# Patient Record
Sex: FEMALE | Race: WHITE | Employment: FULL TIME | URBAN - METROPOLITAN AREA
[De-identification: names, ages, dates, MRNs, and addresses within clinical notes are randomized per-mention and may not be internally consistent; named-entity substitution may affect disease eponyms.]

---

## 2017-07-20 ENCOUNTER — HOSPITAL ENCOUNTER (OUTPATIENT)
Dept: PREADMISSION TESTING | Age: 57
Discharge: HOME | End: 2017-07-20
Payer: COMMERCIAL

## 2017-07-20 LAB
ALBUMIN SERPL BCP-MCNC: 3.9 G/DL (ref 3.4–5)
ALBUMIN/GLOB SERPL: 1.3 {RATIO} (ref 0.8–1.7)
ALP SERPL-CCNC: 57 U/L (ref 45–117)
ALT SERPL-CCNC: 28 U/L (ref 13–56)
ANION GAP BLD CALC-SCNC: 5 MMOL/L (ref 3–18)
AST SERPL W P-5'-P-CCNC: 24 U/L (ref 15–37)
ATRIAL RATE: 51 BPM
BASOPHILS # BLD AUTO: 0.1 K/UL (ref 0–0.06)
BASOPHILS # BLD: 1 % (ref 0–2)
BILIRUB SERPL-MCNC: 0.6 MG/DL (ref 0.2–1)
BUN SERPL-MCNC: 7 MG/DL (ref 7–18)
BUN/CREAT SERPL: 9 (ref 12–20)
CALCIUM SERPL-MCNC: 9 MG/DL (ref 8.5–10.1)
CALCULATED P AXIS, ECG09: 31 DEGREES
CALCULATED R AXIS, ECG10: 11 DEGREES
CALCULATED T AXIS, ECG11: 49 DEGREES
CHLORIDE SERPL-SCNC: 104 MMOL/L (ref 100–108)
CO2 SERPL-SCNC: 32 MMOL/L (ref 21–32)
CREAT SERPL-MCNC: 0.8 MG/DL (ref 0.6–1.3)
DIAGNOSIS, 93000: NORMAL
DIFFERENTIAL METHOD BLD: ABNORMAL
EOSINOPHIL # BLD: 0.1 K/UL (ref 0–0.4)
EOSINOPHIL NFR BLD: 2 % (ref 0–5)
ERYTHROCYTE [DISTWIDTH] IN BLOOD BY AUTOMATED COUNT: 13.5 % (ref 11.6–14.5)
GLOBULIN SER CALC-MCNC: 3.1 G/DL (ref 2–4)
GLUCOSE SERPL-MCNC: 99 MG/DL (ref 74–99)
HCT VFR BLD AUTO: 39.3 % (ref 35–45)
HGB BLD-MCNC: 13.5 G/DL (ref 12–16)
LYMPHOCYTES # BLD AUTO: 23 % (ref 21–52)
LYMPHOCYTES # BLD: 1.6 K/UL (ref 0.9–3.6)
MCH RBC QN AUTO: 29.9 PG (ref 24–34)
MCHC RBC AUTO-ENTMCNC: 34.4 G/DL (ref 31–37)
MCV RBC AUTO: 87.1 FL (ref 74–97)
MONOCYTES # BLD: 0.5 K/UL (ref 0.05–1.2)
MONOCYTES NFR BLD AUTO: 7 % (ref 3–10)
NEUTS SEG # BLD: 4.7 K/UL (ref 1.8–8)
NEUTS SEG NFR BLD AUTO: 67 % (ref 40–73)
P-R INTERVAL, ECG05: 144 MS
PLATELET # BLD AUTO: 314 K/UL (ref 135–420)
PMV BLD AUTO: 10.3 FL (ref 9.2–11.8)
POTASSIUM SERPL-SCNC: 3.8 MMOL/L (ref 3.5–5.5)
PROT SERPL-MCNC: 7 G/DL (ref 6.4–8.2)
Q-T INTERVAL, ECG07: 456 MS
QRS DURATION, ECG06: 88 MS
QTC CALCULATION (BEZET), ECG08: 420 MS
RBC # BLD AUTO: 4.51 M/UL (ref 4.2–5.3)
SODIUM SERPL-SCNC: 141 MMOL/L (ref 136–145)
VENTRICULAR RATE, ECG03: 51 BPM
WBC # BLD AUTO: 7 K/UL (ref 4.6–13.2)

## 2017-07-20 PROCEDURE — 85025 COMPLETE CBC W/AUTO DIFF WBC: CPT | Performed by: OBSTETRICS & GYNECOLOGY

## 2017-07-20 PROCEDURE — 80053 COMPREHEN METABOLIC PANEL: CPT | Performed by: OBSTETRICS & GYNECOLOGY

## 2017-07-20 PROCEDURE — 36415 COLL VENOUS BLD VENIPUNCTURE: CPT | Performed by: OBSTETRICS & GYNECOLOGY

## 2017-07-20 PROCEDURE — 93005 ELECTROCARDIOGRAM TRACING: CPT

## 2017-07-25 ENCOUNTER — ANESTHESIA EVENT (OUTPATIENT)
Dept: SURGERY | Age: 57
End: 2017-07-25
Payer: COMMERCIAL

## 2017-07-26 ENCOUNTER — ANESTHESIA (OUTPATIENT)
Dept: SURGERY | Age: 57
End: 2017-07-26
Payer: COMMERCIAL

## 2017-07-26 ENCOUNTER — HOSPITAL ENCOUNTER (OUTPATIENT)
Age: 57
Setting detail: OUTPATIENT SURGERY
Discharge: HOME | End: 2017-07-26
Attending: OBSTETRICS & GYNECOLOGY | Admitting: OBSTETRICS & GYNECOLOGY
Payer: COMMERCIAL

## 2017-07-26 VITALS
RESPIRATION RATE: 16 BRPM | BODY MASS INDEX: 32.25 KG/M2 | OXYGEN SATURATION: 98 % | DIASTOLIC BLOOD PRESSURE: 52 MMHG | HEIGHT: 61 IN | WEIGHT: 170.8 LBS | TEMPERATURE: 97.5 F | HEART RATE: 68 BPM | SYSTOLIC BLOOD PRESSURE: 123 MMHG

## 2017-07-26 PROBLEM — R10.9 ABDOMINAL PAIN, UNSPECIFIED SITE: Status: ACTIVE | Noted: 2017-07-26

## 2017-07-26 PROBLEM — R10.9 ABDOMINAL PAIN: Chronic | Status: ACTIVE | Noted: 2017-07-26

## 2017-07-26 PROBLEM — D25.0 SUBMUCOUS LEIOMYOMA OF UTERUS: Chronic | Status: ACTIVE | Noted: 2017-07-26

## 2017-07-26 PROCEDURE — 74011000250 HC RX REV CODE- 250

## 2017-07-26 PROCEDURE — 74011250636 HC RX REV CODE- 250/636

## 2017-07-26 PROCEDURE — 77030020255 HC SOL INJ LR 1000ML BG: Performed by: OBSTETRICS & GYNECOLOGY

## 2017-07-26 PROCEDURE — 77030012508 HC MSK AIRWY LMA AMBU -A: Performed by: SPECIALIST

## 2017-07-26 PROCEDURE — 74011250636 HC RX REV CODE- 250/636: Performed by: OBSTETRICS & GYNECOLOGY

## 2017-07-26 PROCEDURE — 76210000021 HC REC RM PH II 0.5 TO 1 HR: Performed by: OBSTETRICS & GYNECOLOGY

## 2017-07-26 PROCEDURE — 74011000250 HC RX REV CODE- 250: Performed by: OBSTETRICS & GYNECOLOGY

## 2017-07-26 PROCEDURE — 76210000006 HC OR PH I REC 0.5 TO 1 HR: Performed by: OBSTETRICS & GYNECOLOGY

## 2017-07-26 PROCEDURE — 77030005537 HC CATH URETH BARD -A: Performed by: OBSTETRICS & GYNECOLOGY

## 2017-07-26 PROCEDURE — 88305 TISSUE EXAM BY PATHOLOGIST: CPT | Performed by: OBSTETRICS & GYNECOLOGY

## 2017-07-26 PROCEDURE — 76060000033 HC ANESTHESIA 1 TO 1.5 HR: Performed by: OBSTETRICS & GYNECOLOGY

## 2017-07-26 PROCEDURE — 76010000149 HC OR TIME 1 TO 1.5 HR: Performed by: OBSTETRICS & GYNECOLOGY

## 2017-07-26 PROCEDURE — 77030032151 HC HYSTSCP FLD MGMT KT DISP S&N -D: Performed by: OBSTETRICS & GYNECOLOGY

## 2017-07-26 PROCEDURE — 77030020782 HC GWN BAIR PAWS FLX 3M -B: Performed by: OBSTETRICS & GYNECOLOGY

## 2017-07-26 RX ORDER — DEXAMETHASONE SODIUM PHOSPHATE 4 MG/ML
INJECTION, SOLUTION INTRA-ARTICULAR; INTRALESIONAL; INTRAMUSCULAR; INTRAVENOUS; SOFT TISSUE AS NEEDED
Status: DISCONTINUED | OUTPATIENT
Start: 2017-07-26 | End: 2017-07-26 | Stop reason: HOSPADM

## 2017-07-26 RX ORDER — FENTANYL CITRATE 50 UG/ML
INJECTION, SOLUTION INTRAMUSCULAR; INTRAVENOUS AS NEEDED
Status: DISCONTINUED | OUTPATIENT
Start: 2017-07-26 | End: 2017-07-26 | Stop reason: HOSPADM

## 2017-07-26 RX ORDER — KETOROLAC TROMETHAMINE 30 MG/ML
30 INJECTION, SOLUTION INTRAMUSCULAR; INTRAVENOUS
Status: DISCONTINUED | OUTPATIENT
Start: 2017-07-26 | End: 2017-07-26 | Stop reason: HOSPADM

## 2017-07-26 RX ORDER — PROPOFOL 10 MG/ML
INJECTION, EMULSION INTRAVENOUS AS NEEDED
Status: DISCONTINUED | OUTPATIENT
Start: 2017-07-26 | End: 2017-07-26 | Stop reason: HOSPADM

## 2017-07-26 RX ORDER — ONDANSETRON 2 MG/ML
4 INJECTION INTRAMUSCULAR; INTRAVENOUS ONCE
Status: DISCONTINUED | OUTPATIENT
Start: 2017-07-26 | End: 2017-07-26 | Stop reason: HOSPADM

## 2017-07-26 RX ORDER — NALOXONE HYDROCHLORIDE 0.4 MG/ML
0.1 INJECTION, SOLUTION INTRAMUSCULAR; INTRAVENOUS; SUBCUTANEOUS AS NEEDED
Status: DISCONTINUED | OUTPATIENT
Start: 2017-07-26 | End: 2017-07-26 | Stop reason: HOSPADM

## 2017-07-26 RX ORDER — ACETAMINOPHEN 10 MG/ML
1000 INJECTION, SOLUTION INTRAVENOUS
Status: DISCONTINUED | OUTPATIENT
Start: 2017-07-26 | End: 2017-07-26 | Stop reason: HOSPADM

## 2017-07-26 RX ORDER — LIDOCAINE HYDROCHLORIDE 20 MG/ML
INJECTION, SOLUTION EPIDURAL; INFILTRATION; INTRACAUDAL; PERINEURAL AS NEEDED
Status: DISCONTINUED | OUTPATIENT
Start: 2017-07-26 | End: 2017-07-26 | Stop reason: HOSPADM

## 2017-07-26 RX ORDER — ONDANSETRON 2 MG/ML
INJECTION INTRAMUSCULAR; INTRAVENOUS AS NEEDED
Status: DISCONTINUED | OUTPATIENT
Start: 2017-07-26 | End: 2017-07-26 | Stop reason: HOSPADM

## 2017-07-26 RX ORDER — FENTANYL CITRATE 50 UG/ML
25 INJECTION, SOLUTION INTRAMUSCULAR; INTRAVENOUS AS NEEDED
Status: DISCONTINUED | OUTPATIENT
Start: 2017-07-26 | End: 2017-07-26 | Stop reason: HOSPADM

## 2017-07-26 RX ORDER — HYDROMORPHONE HYDROCHLORIDE 1 MG/ML
0.5 INJECTION, SOLUTION INTRAMUSCULAR; INTRAVENOUS; SUBCUTANEOUS
Status: DISCONTINUED | OUTPATIENT
Start: 2017-07-26 | End: 2017-07-26 | Stop reason: HOSPADM

## 2017-07-26 RX ORDER — SODIUM CHLORIDE, SODIUM LACTATE, POTASSIUM CHLORIDE, CALCIUM CHLORIDE 600; 310; 30; 20 MG/100ML; MG/100ML; MG/100ML; MG/100ML
125 INJECTION, SOLUTION INTRAVENOUS CONTINUOUS
Status: DISCONTINUED | OUTPATIENT
Start: 2017-07-26 | End: 2017-07-26 | Stop reason: HOSPADM

## 2017-07-26 RX ORDER — MAGNESIUM SULFATE 100 %
4 CRYSTALS MISCELLANEOUS AS NEEDED
Status: DISCONTINUED | OUTPATIENT
Start: 2017-07-26 | End: 2017-07-26 | Stop reason: HOSPADM

## 2017-07-26 RX ORDER — SODIUM CHLORIDE 0.9 % (FLUSH) 0.9 %
5-10 SYRINGE (ML) INJECTION AS NEEDED
Status: DISCONTINUED | OUTPATIENT
Start: 2017-07-26 | End: 2017-07-26 | Stop reason: HOSPADM

## 2017-07-26 RX ORDER — SODIUM CHLORIDE 9 MG/ML
125 INJECTION, SOLUTION INTRAVENOUS CONTINUOUS
Status: DISCONTINUED | OUTPATIENT
Start: 2017-07-26 | End: 2017-07-26 | Stop reason: HOSPADM

## 2017-07-26 RX ORDER — KETOROLAC TROMETHAMINE 30 MG/ML
INJECTION, SOLUTION INTRAMUSCULAR; INTRAVENOUS AS NEEDED
Status: DISCONTINUED | OUTPATIENT
Start: 2017-07-26 | End: 2017-07-26 | Stop reason: HOSPADM

## 2017-07-26 RX ORDER — DEXTROSE 50 % IN WATER (D50W) INTRAVENOUS SYRINGE
25-50 AS NEEDED
Status: DISCONTINUED | OUTPATIENT
Start: 2017-07-26 | End: 2017-07-26 | Stop reason: HOSPADM

## 2017-07-26 RX ORDER — GLYCOPYRROLATE 0.2 MG/ML
INJECTION INTRAMUSCULAR; INTRAVENOUS AS NEEDED
Status: DISCONTINUED | OUTPATIENT
Start: 2017-07-26 | End: 2017-07-26 | Stop reason: HOSPADM

## 2017-07-26 RX ORDER — BUPIVACAINE HYDROCHLORIDE 2.5 MG/ML
INJECTION, SOLUTION EPIDURAL; INFILTRATION; INTRACAUDAL AS NEEDED
Status: DISCONTINUED | OUTPATIENT
Start: 2017-07-26 | End: 2017-07-26 | Stop reason: HOSPADM

## 2017-07-26 RX ORDER — HYDROMORPHONE HYDROCHLORIDE 1 MG/ML
0.2 INJECTION, SOLUTION INTRAMUSCULAR; INTRAVENOUS; SUBCUTANEOUS
Status: DISCONTINUED | OUTPATIENT
Start: 2017-07-26 | End: 2017-07-26 | Stop reason: HOSPADM

## 2017-07-26 RX ORDER — MIDAZOLAM HYDROCHLORIDE 1 MG/ML
INJECTION, SOLUTION INTRAMUSCULAR; INTRAVENOUS AS NEEDED
Status: DISCONTINUED | OUTPATIENT
Start: 2017-07-26 | End: 2017-07-26 | Stop reason: HOSPADM

## 2017-07-26 RX ADMIN — SODIUM CHLORIDE, SODIUM LACTATE, POTASSIUM CHLORIDE, AND CALCIUM CHLORIDE: 600; 310; 30; 20 INJECTION, SOLUTION INTRAVENOUS at 11:56

## 2017-07-26 RX ADMIN — PROPOFOL 200 MG: 10 INJECTION, EMULSION INTRAVENOUS at 11:15

## 2017-07-26 RX ADMIN — SODIUM CHLORIDE, SODIUM LACTATE, POTASSIUM CHLORIDE, AND CALCIUM CHLORIDE 125 ML/HR: 600; 310; 30; 20 INJECTION, SOLUTION INTRAVENOUS at 10:20

## 2017-07-26 RX ADMIN — MIDAZOLAM HYDROCHLORIDE 2 MG: 1 INJECTION, SOLUTION INTRAMUSCULAR; INTRAVENOUS at 11:10

## 2017-07-26 RX ADMIN — FENTANYL CITRATE 25 MCG: 50 INJECTION, SOLUTION INTRAMUSCULAR; INTRAVENOUS at 12:25

## 2017-07-26 RX ADMIN — DEXAMETHASONE SODIUM PHOSPHATE 8 MG: 4 INJECTION, SOLUTION INTRA-ARTICULAR; INTRALESIONAL; INTRAMUSCULAR; INTRAVENOUS; SOFT TISSUE at 11:21

## 2017-07-26 RX ADMIN — LIDOCAINE HYDROCHLORIDE 100 MG: 20 INJECTION, SOLUTION EPIDURAL; INFILTRATION; INTRACAUDAL; PERINEURAL at 11:15

## 2017-07-26 RX ADMIN — FENTANYL CITRATE 25 MCG: 50 INJECTION, SOLUTION INTRAMUSCULAR; INTRAVENOUS at 11:21

## 2017-07-26 RX ADMIN — KETOROLAC TROMETHAMINE 30 MG: 30 INJECTION, SOLUTION INTRAMUSCULAR; INTRAVENOUS at 11:21

## 2017-07-26 RX ADMIN — FENTANYL CITRATE 25 MCG: 50 INJECTION, SOLUTION INTRAMUSCULAR; INTRAVENOUS at 12:20

## 2017-07-26 RX ADMIN — ONDANSETRON 4 MG: 2 INJECTION INTRAMUSCULAR; INTRAVENOUS at 11:10

## 2017-07-26 RX ADMIN — FENTANYL CITRATE 25 MCG: 50 INJECTION, SOLUTION INTRAMUSCULAR; INTRAVENOUS at 11:31

## 2017-07-26 RX ADMIN — GLYCOPYRROLATE 0.1 MG: 0.2 INJECTION INTRAMUSCULAR; INTRAVENOUS at 11:41

## 2017-07-26 RX ADMIN — GLYCOPYRROLATE 0.2 MG: 0.2 INJECTION INTRAMUSCULAR; INTRAVENOUS at 11:10

## 2017-07-26 NOTE — DISCHARGE INSTRUCTIONS
[facility], [address], [address] [facility],  Lilia Kelsie Lao [facility], [address] [facility], [address] [place] [phone], [place], [address] [facility]  Office: [phone]  Fax:    [phone]    PROCEDURE: Procedure(s):  DILATATION AND CURETTAGE, HYSTEROSCOPY    Notify [facility] IMMEDIATELY if any of the following occur:     You are unable to urinate. Urgency to urinate is not uncommon.  Excessive vaginal bleeding > 1 maxi pad an hour for more then 2 hours straight.  Temperature above 101.0° and / or chills.  You are nauseous and / or vomiting and you cannot hold down any fluids.  Your pain is not controlled with the pain medication prescribed. Special Considerations:      Do not drive for at least 24 hours after the procedure and until you are no longer taking narcotic pain medication and you are able to move and react without hesitation.  You may return to work the following day. [x] Pelvic rest (nothing in the vagina) for 2 weeks. [] No heavy lifting over 10 pounds & no strenuous exercise for 6 weeks. [] Other instructions:         MEDICATIONS: PROVIDED AT DISCHARGE OR PREVIOUSLY PRESCRIBED AT PRE OPERATIVE APPOINTMENT WITH [facility] --  Narcotic Pain Med.   []  Vicodin®   [x]  Percocet®   []  Dilaudid®    Non-narcotic Pain Med. [x]   Ibuprofen        Antibiotics   []  Cipro®   []  Keflex®     []  Bactrim DS®       Urgency   []   Vesicare®       Nausea      []    Zofran®     []    Phenergan®       Other   []    Colace          If you have not already scheduled your follow-up appointment please do so with our office staff. Your appointment should be in 2 weeks. Please contact [place] [address] [facility] at [phone] or go to the nearest Emergency Department / Urgent Care facility for any other medical questions or concerns.              DISCHARGE SUMMARY from Nurse    The following personal items are in your possession at time of discharge:    Dental Appliances: Other (comment) (dental implants )  Visual Aid: Glasses, Sent home (given to [facility] )     Home Medications: None  Jewelry: Earrings (Given to [facility] )  Clothing:  Georgette Eagle 5)  Other Valuables: Eyeglasses (Given to [facility] )             PATIENT INSTRUCTIONS:    After general anesthesia or intravenous sedation, for 24 hours or while taking prescription Narcotics:  · Limit your activities  · Do not drive and operate hazardous machinery  · Do not make important personal or business decisions  · Do  not drink alcoholic beverages  · If you have not urinated within 8 hours after discharge, please contact your surgeon on call. Report the following to your surgeon:  · Excessive pain, swelling, redness or odor of or around the surgical area  · Temperature over 100.5  · Nausea and vomiting lasting longer than 4 hours or if unable to take medications  · Any signs of decreased circulation or nerve impairment to extremity: change in color, persistent  numbness, tingling, coldness or increase pain  · Any questions        What to do at Home:  Recommended activity: Ambulate in house,     If you experience any of the following symptoms fever, chills, uncontrollable pain or nausea, please follow up with .      *  Please give a list of your current medications to your Primary Care Provider. *  Please update this list whenever your medications are discontinued, doses are      changed, or new medications (including over-the-counter products) are added. *  Please carry medication information at all times in case of emergency situations. These are general instructions for a healthy lifestyle:    No smoking/ No tobacco products/ Avoid exposure to second hand smoke    Surgeon General's Warning:  Quitting smoking now greatly reduces serious risk to your health.     Obesity, smoking, and sedentary lifestyle greatly increases your risk for illness    A healthy diet, regular physical exercise & weight monitoring are important for maintaining a healthy lifestyle    You may be retaining fluid if you have a history of heart failure or if you experience any of the following symptoms:  Weight gain of 3 pounds or more overnight or 5 pounds in a week, increased swelling in our hands or feet or shortness of breath while lying flat in bed. Please call your doctor as soon as you notice any of these symptoms; do not wait until your next office visit. Recognize signs and symptoms of STROKE:    F-face looks uneven    A-arms unable to move or move unevenly    S-speech slurred or non-existent    T-time-call 911 as soon as signs and symptoms begin-DO NOT go       Back to bed or wait to see if you get better-TIME IS BRAIN. Warning Signs of HEART ATTACK     Call 911 if you have these symptoms:   Chest discomfort. Most heart attacks involve discomfort in the center of the chest that lasts more than a few minutes, or that goes away and comes back. It can feel like uncomfortable pressure, squeezing, fullness, or pain.  Discomfort in other areas of the upper body. Symptoms can include pain or discomfort in one or both arms, the back, neck, jaw, or stomach.  Shortness of breath with or without chest discomfort.  Other signs may include breaking out in a cold sweat, nausea, or lightheadedness. Don't wait more than five minutes to call 911 - MINUTES MATTER! Fast action can save your life. Calling 911 is almost always the fastest way to get lifesaving treatment. Emergency Medical Services staff can begin treatment when they arrive -- up to an hour sooner than if someone gets to the hospital by car. The discharge information has been reviewed with the patient and caregiver. The patient and caregiver verbalized understanding. Discharge medications reviewed with the patient and caregiver and appropriate educational materials and side effects teaching were provided.       Patient armband removed and shredded

## 2017-07-26 NOTE — ANESTHESIA POSTPROCEDURE EVALUATION
Post-Anesthesia Evaluation and Assessment    Cardiovascular Function/Vital Signs  Visit Vitals    /55    Pulse 87    Temp 36.7 °C (98.1 °F)    Resp 11    Ht 5' 1\" (1.549 m)    Wt 77.5 kg (170 lb 12.8 oz)    SpO2 99%    BMI 32.27 kg/m2       Patient is status post Procedure(s):  DILATATION AND CURETTAGE, HYSTEROSCOPY. Nausea/Vomiting: Controlled. Postoperative hydration reviewed and adequate. Pain:  Pain Scale 1: Numeric (0 - 10) (07/26/17 1250)  Pain Intensity 1: 0 (07/26/17 1250)   Managed. Neurological Status:   Neuro (WDL): Within Defined Limits (07/26/17 1006)   At baseline. Mental Status and Level of Consciousness: Arousable. Pulmonary Status:   O2 Device: Room air (07/26/17 1250)   Adequate oxygenation and airway patent. Complications related to anesthesia: None    Post-anesthesia assessment completed. No concerns. Patient has met all discharge requirements.     Signed By: Rosa Roman MD    July 26, 2017

## 2017-07-26 NOTE — ANESTHESIA PREPROCEDURE EVALUATION
Anesthetic History   No history of anesthetic complications     Pertinent negatives: No PONV       Review of Systems / Medical History  Patient summary reviewed, nursing notes reviewed and pertinent labs reviewed    Pulmonary              Pertinent negatives: No COPD, asthma and smoker     Neuro/Psych   Within defined limits           Cardiovascular    Hypertension: well controlled            Pertinent negatives: No past MI and CAD       GI/Hepatic/Renal  Within defined limits           Pertinent negatives: No GERD, liver disease and renal disease   Endo/Other  Within defined limits        Pertinent negatives: No diabetes   Other Findings   Comments: etoh -           Physical Exam    Airway  Mallampati: III  TM Distance: < 4 cm  Neck ROM: normal range of motion   Mouth opening: Normal     Cardiovascular    Rhythm: regular  Rate: normal         Dental         Pulmonary  Breath sounds clear to auscultation               Abdominal         Other Findings            Anesthetic Plan    ASA: 2  Anesthesia type: general            Anesthetic plan and risks discussed with: Patient

## 2017-07-26 NOTE — DISCHARGE SUMMARY
Discharge Summary     Name: Valentina Sanchez MRN: [id number]  SSN: [id number]    YOB: 1960  Age: 60 y.o. Sex: female      Admit Date: 7/26/2017    Discharge Date: 7/26/2017      Admitting Physician: Janelle Villalobos MD     * Admission Diagnoses: Fibroids, THICKENED ENDOMETRIAL STRIPE    * Discharge Diagnoses:   Hospital Problems as of 7/26/2017  Date Reviewed: 7/26/2017          Codes Class Noted - Resolved POA    Abdominal pain (Chronic) ICD-10-CM: R10.9  ICD-9-CM: 789.00  7/26/2017 - Present Yes        * (Principal)Submucous leiomyoma of uterus (Chronic) ICD-10-CM: D25.0  ICD-9-CM: 218.0  7/26/2017 - Present Yes        Abdominal pain, unspecified site ICD-10-CM: R10.9  ICD-9-CM: 789.00  7/26/2017 - Present Unknown               * Procedures: Hysteroscopy D&C    * Discharge Condition: [facility] Course: Normal hospital course for this procedure. Significant Diagnostic Studies: No results found for this or any previous visit (from the past 24 hour(s)). * Disposition: Home    Discharge Medications:   Current Discharge Medication List      CONTINUE these medications which have NOT CHANGED    Details   telmisartan-hydroCHLOROthiazide (MICARDIS HCT) 40-12.5 mg per tablet Take 1 Tab by mouth nightly. Indications: hypertension      multivitamin (ONE A DAY) tablet Take 1 Tab by mouth daily. cholecalciferol (VITAMIN D3) 1,000 unit cap Take 1,000 Units by mouth daily. methylphenidate (RITALIN) 20 mg tablet Take 20 mg by mouth two (2) times a dayIndications: ATTENTION-DEFICIT HYPERACTIVITY DISORDER. * Follow-up Care/Patient Instructions: Activity: No sex, douching, or tampons for 2 weeks or as directed by your physician. No driving while taking pain medication. Diet: Resume pre-hospital diet  Wound Care: Pelvic rest x 2 wks.       Signed By:  Janelle Villalobos MD     July 26, 2017

## 2017-07-26 NOTE — PERIOP NOTES
Discharge instructions reviewed with patient and caregiver. No questions. PIV discontinued with catheter tip intact. Personal belongings with patient. Dressed with caregiver's help. Taken to private vehicle via wheelchair.

## 2017-07-26 NOTE — PERIOP NOTES
TRANSFER - OUT REPORT:    Verbal report given to Nahum Biggs RN(name) on [facility]  being transferred to phase 2(unit) for routine post - op       Report consisted of patients Situation, Background, Assessment and   Recommendations(SBAR). Information from the following report(s) SBAR, Kardex, OR Summary, Procedure Summary, Intake/Output and MAR was reviewed with the receiving nurse. Lines:   Peripheral IV 07/26/17 Left; Inner Forearm (Active)   Site Assessment Clean, dry, & intact 7/26/2017 12:51 PM   Phlebitis Assessment 0 7/26/2017 12:51 PM   Infiltration Assessment 0 7/26/2017 12:51 PM   Dressing Status Clean, dry, & intact 7/26/2017 12:51 PM   Dressing Type Transparent;Tape 7/26/2017 12:51 PM   Hub Color/Line Status Infusing;Green 7/26/2017 12:51 PM        Opportunity for questions and clarification was provided.       Patient transported with:   Registered Nurse  Tech

## 2017-07-26 NOTE — BRIEF OP NOTE
BRIEF OPERATIVE NOTE    Date of Procedure: 7/26/2017   Preoperative Diagnosis: FIBROID, THICKENED ENDOMETRIAL STRIPE  Postoperative Diagnosis: FIBROID, THICKENED ENDOMETRIAL STRIPE    Procedure(s):  DILATATION AND CURETTAGE, HYSTEROSCOPY  Surgeon(s) and Role:     * Ayana Chavez MD - Primary         Assistant Staff:       Surgical Staff:  Circ-1: Issa Rangel RN  Scrub Tech-1: Roger Hansen  Scrub Tech-Relief: Tad Schwab  Surg Asst-1: Arminda Durant  Event Time In   Incision Start 1132   Incision Close 1217     Anesthesia: General   Estimated Blood Loss: min  Specimens:   ID Type Source Tests Collected by Time Destination   1 : ENDOMETRIAL CURETTINGS Preservative Endometrial  Ayana Chavez MD 7/26/2017 1153 Pathology      Findings: see full op report   Complications: none  Implants: * No implants in log *

## 2017-07-27 NOTE — OP NOTES
[address] [place] [address]  OPERATIVE REPORT    Name:  Chana Menchaca  MR#:  [id number]  :  1960  Account #:  [de-identified]  Date of Adm:  2017  Date of Surgery:  2017      PREOPERATIVE DIAGNOSES:  1. Abdominal pain. 2. Submucosal leiomyoma of uterus. 3. Hypertension. 4. Obesity. POSTOPERATIVE DIAGNOSES:  1. Abdominal pain. 2. Leiomyomatous uterus. 3. Hypertension. 4. Obesity. PROCEDURES PERFORMED: Hysteroscopy, dilatation and  curettage with Truclear. SURGEON: Serafin Noriega MD.    ANESTHESIOLOGIST: Dr. Ashley Casillas. ANESTHESIA: General paracervical block. COMPLICATIONS: None. ESTIMATED BLOOD LOSS: Minimal.    INTRAVENOUS FLUIDS: 1600 mL lactated Ringer's. SPECIMENS REMOVED: Endometrial curettings. EBL: Minimal.    FINDINGS: A stenotic cervical os with a very atrophic vaginal mucosa  with the cervix pretty flush up against the vaginal cavity, a stenotic  endocervical canal, uterus sounding to 7.5 cm anteverted, ostia normal  bilaterally. The right ostia appeared more distal than the left ostia with  midline fundal prominence more appearing somewhat arcuate, no  definitive submucosal myoma appreciated. INDICATIONS: This is a 12-year-old with complaints of abdominal  pain. She had a transvaginal ultrasound demonstrating a uterus that  was 7.2 x 3.8 x 4.7 cm with an endometrial thickness of 10 mm,  submucosal myoma possibly noted, anterior uterine body measuring  1.6 x 1 x 1.5 cm, left pedunculated myoma possibly measuring 1.3 cm,  right ovary not visualized. Left ovary normal. No free fluid was  appreciated. Findings were reviewed with the patient. Risks, benefits,  alternatives discussed, and she opted for surgical management as  stated above. All questions answered at that time. DESCRIPTION OF PROCEDURE: The patient was taken to the OR,  where general anesthesia was obtained without difficulty.  She was  prepared and draped in the usual sterile fashion in dorsal lithotomy  position with legs in stirrups. A weighted speculum was placed in the  vagina, a Lozano retractor in the anterior fornix to expose the cervix. Anterior lip of the cervix was grasped with a single-tooth tenaculum  and a paracervical block was performed using 20 mL of 0.25%  Marcaine plain. The cervical os was stenotic and a 11 blade was used  to make a cruciate incision. There was mucousy discharge noted. Discharge/drainage noted. The cervical os was gradually dilated to 9  Luz to allow introduction of the Truclear myomectomy scope. This  was advanced into the uterus initially with the obturator and then the  scope attached. Survey of the endometrial cavity demonstrated the  above findings. The scope was removed and a gentle curettage was  performed until a gritty texture was noted throughout. Specimen  handed off. Instruments removed. Excellent hemostasis assured at  end of case. The patient tolerated the procedure well. Sponge, lap, needle and  instrument counts were correct x2. She was taken to recovery area in  stable condition.         Lina Timmons MD    TT / HC  D:  07/26/2017   12:27  T:  07/27/2017   00:46  Job #:  [id number]

## 2021-03-30 NOTE — IP AVS SNAPSHOT
Inna Chandler 
 
 
 [address] [facility] [address] Patient: Kristie Younger MRN: [id number] You are allergic to the following No active allergies Recent Documentation Height Weight BMI OB Status Smoking Status 1.549 m 77.5 kg 32.27 kg/m2 Postmenopausal Never Smoker Emergency Contacts Name Discharge Info Relation Home Work Mobile Sergei Pozo DISCHARGE CAREGIVER [3] Spouse [3] [phone] About your hospitalization You were admitted on:  July 26, 2017 You last received care in the:  [facility] PHASE 2 RECOVERY You were discharged on:  July 26, 2017 Unit phone number:    
  
Why you were hospitalized Your primary diagnosis was:  Submucous Leiomyoma Of Uterus Your diagnoses also included:  Abdominal Pain, Abdominal Pain, Unspecified Site Providers Seen During Your Hospitalizations Provider Role Specialty Primary office phone Max Shaffer MD Attending Provider Obstetrics & Gynecology [phone] Your Primary Care Physician (PCP) Primary Care Physician Office Phone Office Fax [place], [address] [phone] Follow-up Information Follow up With Details Comments Contact Info Karis Leyden, [address] [facility] [address] [facility] [address] 
[phone] Max Shaffer MD Schedule an appointment as soon as possible for a visit in 1 week(s)  [address] [place] [address] [address] [facility] [address] 
[phone] Current Discharge Medication List  
  
CONTINUE these medications which have NOT CHANGED Dose & Instructions Dispensing Information Comments Morning Noon Evening Bedtime  
 cholecalciferol 1,000 unit Cap Commonly known as:  VITAMIN D3 Your last dose was: Your next dose is:    
   
   
 Dose:  1000 Units Take 1,000 Units by mouth daily. Refills:  0 MICARDIS HCT 40-12.5 mg per tablet Generic drug:  telmisartan-hydroCHLOROthiazide Your last dose was: Your next dose is:    
   
   
 Dose:  1 Tab Take 1 Tab by mouth nightly. Indications: hypertension Refills:  0  
     
   
   
   
  
 multivitamin tablet Commonly known as:  ONE A DAY Your last dose was: Your next dose is:    
   
   
 Dose:  1 Tab Take 1 Tab by mouth daily. Refills:  0  
     
   
   
   
  
 RITALIN 20 mg tablet Generic drug:  methylphenidate Your last dose was: Your next dose is:    
   
   
 Dose:  20 mg Take 20 mg by mouth two (2) times a dayIndications: ATTENTION-DEFICIT HYPERACTIVITY DISORDER. Refills:  0 Discharge Instructions [facility], [address] [facility], [address] [facility],  Maximo Perkins [facility], [address] [place] [address] Office: [phone] Fax:    [phone] PROCEDURE: Procedure(s): DILATATION AND CURETTAGE, HYSTEROSCOPY Notify [facility] IMMEDIATELY if any of the following occur: ? You are unable to urinate. Urgency to urinate is not uncommon. ? Excessive vaginal bleeding > 1 maxi pad an hour for more then 2 hours straight. ? Temperature above 101.0° and / or chills. ? You are nauseous and / or vomiting and you cannot hold down any fluids. ? Your pain is not controlled with the pain medication prescribed. Special Considerations:  
 
? Do not drive for at least 24 hours after the procedure and until you are no longer taking narcotic pain medication and you are able to move and react without hesitation. ? You may return to work the following day. Pelvic rest (nothing in the vagina) for 2 weeks. No heavy lifting over 10 pounds & no strenuous exercise for 6 weeks. Other instructions: MEDICATIONS: PROVIDED AT DISCHARGE OR PREVIOUSLY PRESCRIBED AT PRE OPERATIVE APPOINTMENT WITH [facility] -- 
Narcotic Pain Med. Vicodin®     Percocet®     Dilaudid® Non-narcotic Pain Med. Ibuprofen Antibiotics     Cipro®     Keflex®       Bactrim DS® Urgency      Vesicare® Nausea Ardelle Mort Phenergan® Other       Colace If you have not already scheduled your follow-up appointment please do so with our office staff. Your appointment should be in 2 weeks. Please contact [facility]. OB/GYN at [phone] or go to the nearest Emergency Department / Urgent Care facility for any other medical questions or concerns. DISCHARGE SUMMARY from Nurse The following personal items are in your possession at time of discharge: 
 
Dental Appliances: Other (comment) (dental implants ) Visual Aid: Glasses, Sent home (given to [facility] ) Home Medications: None Jewelry: Earrings (Given to [facility] ) Clothing:  (Locker [employer]) Other Valuables: Eyeglasses (Given to [facility] ) PATIENT INSTRUCTIONS: 
 
After general anesthesia or intravenous sedation, for 24 hours or while taking prescription Narcotics: · Limit your activities · Do not drive and operate hazardous machinery · Do not make important personal or business decisions · Do  not drink alcoholic beverages · If you have not urinated within 8 hours after discharge, please contact your surgeon on call. Report the following to your surgeon: 
· Excessive pain, swelling, redness or odor of or around the surgical area · Temperature over 100.5 · Nausea and vomiting lasting longer than 4 hours or if unable to take medications · Any signs of decreased circulation or nerve impairment to extremity: change in color, persistent  numbness, tingling, coldness or increase pain · Any questions What to do at Home: 
Recommended activity: Ambulate in house, If you experience any of the following symptoms fever, chills, uncontrollable pain or nausea, please follow up with . 
 
 
*  Please give a list of your current medications to your Primary Care Provider. *  Please update this list whenever your medications are discontinued, doses are 
    changed, or new medications (including over-the-counter products) are added. *  Please carry medication information at all times in case of emergency situations. These are general instructions for a healthy lifestyle: No smoking/ No tobacco products/ Avoid exposure to second hand smoke Surgeon General's Warning:  Quitting smoking now greatly reduces serious risk to your health. Obesity, smoking, and sedentary lifestyle greatly increases your risk for illness A healthy diet, regular physical exercise & weight monitoring are important for maintaining a healthy lifestyle You may be retaining fluid if you have a history of heart failure or if you experience any of the following symptoms:  Weight gain of 3 pounds or more overnight or 5 pounds in a week, increased swelling in our hands or feet or shortness of breath while lying flat in bed. Please call your doctor as soon as you notice any of these symptoms; do not wait until your next office visit. Recognize signs and symptoms of STROKE: 
 
F-face looks uneven A-arms unable to move or move unevenly S-speech slurred or non-existent T-time-call 911 as soon as signs and symptoms begin-DO NOT go Back to bed or wait to see if you get better-TIME IS BRAIN. Warning Signs of HEART ATTACK Call 911 if you have these symptoms: 
? Chest discomfort. Most heart attacks involve discomfort in the center of the chest that lasts more than a few minutes, or that goes away and comes back. It can feel like uncomfortable pressure, squeezing, fullness, or pain. ? Discomfort in other areas of the upper body. Symptoms can include pain or discomfort in one or both arms, the back, neck, jaw, or stomach. ? Shortness of breath with or without chest discomfort. ? Other signs may include breaking out in a cold sweat, nausea, or lightheadedness. Don't wait more than five minutes to call [address]! Fast action can save your life. Calling 911 is almost always the fastest way to get lifesaving treatment. Emergency Medical Services staff can begin treatment when they arrive  up to an hour sooner than if someone gets to the hospital by car. The discharge information has been reviewed with the patient and caregiver. The patient and caregiver verbalized understanding. Discharge medications reviewed with the patient and caregiver and appropriate educational materials and side effects teaching were provided. Patient armband removed and shredded Discharge Orders None [employer] Announcement We are excited to announce that we are making your provider's discharge notes available to you in [employer]. You will see these notes when they are completed and signed by the physician that discharged you from your recent hospital stay. If you have any questions or concerns about any information you see in [employer], please call the Health Information Department where you were seen or reach out to your Primary Care Provider for more information about your plan of care. Introducing [facility]! Alison Blandon introduces [employer] patient portal. Now you can access parts of your medical record, email your doctor's office, and request medication refills online. 1. In your internet browser, go to [email or web address] 2. Click on the First Time User? Click Here link in the Sign In box. You will see the New Member Sign Up page. 3. Enter your [employer] Access Code exactly as it appears below. You will not need to use this code after youve completed the sign-up process. If you do not sign up before the expiration date, you must request a new code. · [employer] Access Code: [email or web address] Expires: 10/15/2017  4:38 PM 
 
4. Enter the last four digits of your Social Security Number (xxxx) and Date of Birth (mm/dd/yyyy) as indicated and click Submit. You will be taken to the next sign-up page. 5. Create a [employer] ID. This will be your [employer] login ID and cannot be changed, so think of one that is secure and easy to remember. 6. Create a [employer] password. You can change your password at any time. 7. Enter your Password Reset Question and Answer. This can be used at a later time if you forget your password. 8. Enter your e-mail address. You will receive e-mail notification when new information is available in [address] 9. Click Sign Up. You can now view and download portions of your medical record. 10. Click the Download Summary menu link to download a portable copy of your medical information. If you have questions, please visit the Frequently Asked Questions section of the [employer] website. Remember, [employer] is NOT to be used for urgent needs. For medical emergencies, dial 911. Now available from your iPhone and Android! General Information Please provide this summary of care documentation to your next provider. Patient Signature:  ____________________________________________________________ Date:  ____________________________________________________________  
  
Lulu Nichols Provider Signature:  ____________________________________________________________ Date:  ____________________________________________________________ Xolair Pregnancy And Lactation Text: This medication is Pregnancy Category B and is considered safe during pregnancy. This medication is excreted in breast milk.

## (undated) DEVICE — CATH URETH INTMIT ROB 16FR FUN -- CONVERT TO ITEM 179520

## (undated) DEVICE — SOLUTION LACTATED RINGERS INJECTION USP

## (undated) DEVICE — D&C HYSTEROSCOPY: Brand: MEDLINE INDUSTRIES, INC.

## (undated) DEVICE — STERILE POLYISOPRENE POWDER-FREE SURGICAL GLOVES: Brand: PROTEXIS

## (undated) DEVICE — NEEDLE HYPO 22GA L1 1/2IN PIVOTING SHLD FOR LUERLOCK SYR

## (undated) DEVICE — HYSTEROSCOPIC PROCEDURE KIT: Brand: QUICKPICK

## (undated) DEVICE — (D)SYR 10ML 1/5ML GRAD NSAF -- PKGING CHANGE USE ITEM 338027